# Patient Record
Sex: MALE | Race: BLACK OR AFRICAN AMERICAN | NOT HISPANIC OR LATINO | URBAN - METROPOLITAN AREA
[De-identification: names, ages, dates, MRNs, and addresses within clinical notes are randomized per-mention and may not be internally consistent; named-entity substitution may affect disease eponyms.]

---

## 2017-01-07 ENCOUNTER — EMERGENCY (EMERGENCY)
Facility: HOSPITAL | Age: 57
LOS: 1 days | Discharge: HOME | End: 2017-01-07
Attending: EMERGENCY MEDICINE | Admitting: EMERGENCY MEDICINE
Payer: COMMERCIAL

## 2017-01-07 VITALS
DIASTOLIC BLOOD PRESSURE: 81 MMHG | RESPIRATION RATE: 19 BRPM | HEART RATE: 76 BPM | TEMPERATURE: 99 F | SYSTOLIC BLOOD PRESSURE: 126 MMHG | OXYGEN SATURATION: 100 %

## 2017-01-07 VITALS
OXYGEN SATURATION: 98 % | TEMPERATURE: 98 F | SYSTOLIC BLOOD PRESSURE: 133 MMHG | RESPIRATION RATE: 20 BRPM | DIASTOLIC BLOOD PRESSURE: 86 MMHG | HEART RATE: 78 BPM

## 2017-01-07 DIAGNOSIS — Z98.89 OTHER SPECIFIED POSTPROCEDURAL STATES: Chronic | ICD-10-CM

## 2017-01-07 LAB
ALBUMIN SERPL ELPH-MCNC: 4.4 G/DL — SIGNIFICANT CHANGE UP (ref 3.3–5)
ALP SERPL-CCNC: 55 U/L — SIGNIFICANT CHANGE UP (ref 40–120)
ALT FLD-CCNC: 23 U/L — SIGNIFICANT CHANGE UP (ref 4–41)
APTT BLD: 35.6 SEC — SIGNIFICANT CHANGE UP (ref 27.5–37.4)
AST SERPL-CCNC: 30 U/L — SIGNIFICANT CHANGE UP (ref 4–40)
BASE EXCESS BLDV CALC-SCNC: 4.1 MMOL/L — SIGNIFICANT CHANGE UP
BASOPHILS # BLD AUTO: 0.03 K/UL — SIGNIFICANT CHANGE UP (ref 0–0.2)
BASOPHILS NFR BLD AUTO: 0.7 % — SIGNIFICANT CHANGE UP (ref 0–2)
BILIRUB SERPL-MCNC: 0.5 MG/DL — SIGNIFICANT CHANGE UP (ref 0.2–1.2)
BLOOD GAS VENOUS - CREATININE: 1.43 MG/DL — HIGH (ref 0.5–1.3)
BUN SERPL-MCNC: 21 MG/DL — SIGNIFICANT CHANGE UP (ref 7–23)
CALCIUM SERPL-MCNC: 9.5 MG/DL — SIGNIFICANT CHANGE UP (ref 8.4–10.5)
CHLORIDE BLDV-SCNC: 107 MMOL/L — SIGNIFICANT CHANGE UP (ref 96–108)
CHLORIDE SERPL-SCNC: 105 MMOL/L — SIGNIFICANT CHANGE UP (ref 98–107)
CK MB BLD-MCNC: 0.7 — SIGNIFICANT CHANGE UP (ref 0–2.5)
CK MB BLD-MCNC: 3.12 NG/ML — SIGNIFICANT CHANGE UP (ref 1–6.6)
CK SERPL-CCNC: 437 U/L — HIGH (ref 30–200)
CO2 SERPL-SCNC: 28 MMOL/L — SIGNIFICANT CHANGE UP (ref 22–31)
CREAT SERPL-MCNC: 1.39 MG/DL — HIGH (ref 0.5–1.3)
EOSINOPHIL # BLD AUTO: 0.06 K/UL — SIGNIFICANT CHANGE UP (ref 0–0.5)
EOSINOPHIL NFR BLD AUTO: 1.4 % — SIGNIFICANT CHANGE UP (ref 0–6)
GAS PNL BLDV: 140 MMOL/L — SIGNIFICANT CHANGE UP (ref 136–146)
GLUCOSE BLDV-MCNC: 86 — SIGNIFICANT CHANGE UP (ref 70–99)
GLUCOSE SERPL-MCNC: 92 MG/DL — SIGNIFICANT CHANGE UP (ref 70–99)
HCO3 BLDV-SCNC: 27 MMOL/L — SIGNIFICANT CHANGE UP (ref 20–27)
HCT VFR BLD CALC: 38.7 % — LOW (ref 39–50)
HCT VFR BLDV CALC: 41.5 % — SIGNIFICANT CHANGE UP (ref 39–51)
HGB BLD-MCNC: 13.1 G/DL — SIGNIFICANT CHANGE UP (ref 13–17)
HGB BLDV-MCNC: 13.5 G/DL — SIGNIFICANT CHANGE UP (ref 13–17)
IMM GRANULOCYTES NFR BLD AUTO: 0.2 % — SIGNIFICANT CHANGE UP (ref 0–1.5)
INR BLD: 0.96 — SIGNIFICANT CHANGE UP (ref 0.87–1.18)
LACTATE BLDV-MCNC: 0.7 MMOL/L — SIGNIFICANT CHANGE UP (ref 0.5–2)
LYMPHOCYTES # BLD AUTO: 1.12 K/UL — SIGNIFICANT CHANGE UP (ref 1–3.3)
LYMPHOCYTES # BLD AUTO: 26.9 % — SIGNIFICANT CHANGE UP (ref 13–44)
MCHC RBC-ENTMCNC: 32.8 PG — SIGNIFICANT CHANGE UP (ref 27–34)
MCHC RBC-ENTMCNC: 33.9 % — SIGNIFICANT CHANGE UP (ref 32–36)
MCV RBC AUTO: 96.8 FL — SIGNIFICANT CHANGE UP (ref 80–100)
MONOCYTES # BLD AUTO: 0.37 K/UL — SIGNIFICANT CHANGE UP (ref 0–0.9)
MONOCYTES NFR BLD AUTO: 8.9 % — SIGNIFICANT CHANGE UP (ref 2–14)
NEUTROPHILS # BLD AUTO: 2.57 K/UL — SIGNIFICANT CHANGE UP (ref 1.8–7.4)
NEUTROPHILS NFR BLD AUTO: 61.9 % — SIGNIFICANT CHANGE UP (ref 43–77)
PCO2 BLDV: 47 MMHG — SIGNIFICANT CHANGE UP (ref 41–51)
PH BLDV: 7.4 PH — SIGNIFICANT CHANGE UP (ref 7.32–7.43)
PLATELET # BLD AUTO: 217 K/UL — SIGNIFICANT CHANGE UP (ref 150–400)
PMV BLD: 10.4 FL — SIGNIFICANT CHANGE UP (ref 7–13)
PO2 BLDV: 42 MMHG — HIGH (ref 35–40)
POTASSIUM BLDV-SCNC: 3.8 MMOL/L — SIGNIFICANT CHANGE UP (ref 3.4–4.5)
POTASSIUM SERPL-MCNC: 4 MMOL/L — SIGNIFICANT CHANGE UP (ref 3.5–5.3)
POTASSIUM SERPL-SCNC: 4 MMOL/L — SIGNIFICANT CHANGE UP (ref 3.5–5.3)
PROT SERPL-MCNC: 7.2 G/DL — SIGNIFICANT CHANGE UP (ref 6–8.3)
PROTHROM AB SERPL-ACNC: 10.9 SEC — SIGNIFICANT CHANGE UP (ref 10–13.1)
RBC # BLD: 4 M/UL — LOW (ref 4.2–5.8)
RBC # FLD: 14.1 % — SIGNIFICANT CHANGE UP (ref 10.3–14.5)
SAO2 % BLDV: 76.2 % — SIGNIFICANT CHANGE UP (ref 60–85)
SODIUM SERPL-SCNC: 144 MMOL/L — SIGNIFICANT CHANGE UP (ref 135–145)
TROPONIN T SERPL-MCNC: < 0.06 NG/ML — SIGNIFICANT CHANGE UP (ref 0–0.06)
WBC # BLD: 4.16 K/UL — SIGNIFICANT CHANGE UP (ref 3.8–10.5)
WBC # FLD AUTO: 4.16 K/UL — SIGNIFICANT CHANGE UP (ref 3.8–10.5)

## 2017-01-07 PROCEDURE — 71020: CPT | Mod: 26

## 2017-01-07 PROCEDURE — 99284 EMERGENCY DEPT VISIT MOD MDM: CPT | Mod: 25

## 2017-01-07 PROCEDURE — 93010 ELECTROCARDIOGRAM REPORT: CPT

## 2017-01-07 RX ORDER — SODIUM CHLORIDE 9 MG/ML
1000 INJECTION INTRAMUSCULAR; INTRAVENOUS; SUBCUTANEOUS ONCE
Qty: 0 | Refills: 0 | Status: COMPLETED | OUTPATIENT
Start: 2017-01-07 | End: 2017-01-07

## 2017-01-07 RX ORDER — FAMOTIDINE 10 MG/ML
20 INJECTION INTRAVENOUS ONCE
Qty: 0 | Refills: 0 | Status: COMPLETED | OUTPATIENT
Start: 2017-01-07 | End: 2017-01-07

## 2017-01-07 RX ORDER — LIDOCAINE 4 G/100G
10 CREAM TOPICAL ONCE
Qty: 0 | Refills: 0 | Status: COMPLETED | OUTPATIENT
Start: 2017-01-07 | End: 2017-01-07

## 2017-01-07 RX ADMIN — SODIUM CHLORIDE 2000 MILLILITER(S): 9 INJECTION INTRAMUSCULAR; INTRAVENOUS; SUBCUTANEOUS at 19:09

## 2017-01-07 RX ADMIN — FAMOTIDINE 20 MILLIGRAM(S): 10 INJECTION INTRAVENOUS at 17:17

## 2017-01-07 RX ADMIN — LIDOCAINE 10 MILLILITER(S): 4 CREAM TOPICAL at 17:17

## 2017-01-07 RX ADMIN — Medication 30 MILLILITER(S): at 17:17

## 2017-01-07 NOTE — ED PROVIDER NOTE - FAMILY HISTORY
Mother  Still living? Unknown  Family history of diabetes mellitus, Age at diagnosis: Age Unknown     Aunt  Still living? Unknown  Family history of diabetes mellitus, Age at diagnosis: Age Unknown

## 2017-01-07 NOTE — ED PROVIDER NOTE - OBJECTIVE STATEMENT
Keshia att: 57M hx esoph ca c/o 3-4d midsternal ache. 2004 Patient diagnosed with esophageal cancer, remote radiation chemo, now gets routine endoscopy q2-3years. December 23 2016 patient underwent routine endoscopy, no complication. Past few days patient notes chest discomfort, points midsternal, ache, constant, nonradiating, nonexertional, neg sob pleurisy or cough associated. Worse with lateral movements (demonstrates with lateral twisting), improved yesterday with pepcid AC. Denies palpitation, pleurisy, swollen or tender calves. Neg hx dvt pe anticoag. Occasional smoker, personal hx of htn hld, last stress years ago, no fam hx of cad. Keshia att: 57M hx esoph ca c/o 3-4d midsternal ache. 2004 Patient diagnosed with esophageal cancer, remote radiation chemo, now gets routine endoscopy q2-3years. December 23 2016 patient underwent routine endoscopy, no complication, biospy neg active disease. Past few days patient notes chest discomfort, points midsternal, ache, constant, nonradiating, nonexertional, neg sob pleurisy or cough associated. Worse with lateral movements (demonstrates with lateral twisting), improved yesterday with pepcid AC. Denies palpitation, pleurisy, swollen or tender calves. Neg hx dvt pe anticoag. Occasional smoker, personal hx of htn hld, last stress years ago, no fam hx of cad.

## 2017-01-07 NOTE — ED PROVIDER NOTE - ATTENDING CONTRIBUTION TO CARE
Dr. Schmitt: I have personally seen and examined this patient at the bedside. I have fully participated in the care of this patient. I have reviewed all pertinent clinical information, including history, physical exam, plan and the Resident's note and agree except as noted. HPI above as by me. PE above as by me. MDM 57M hx remote esophageal ca, remote rad chemo, c/o constant midsternal cp x 3-4 days. Heart score 2 w nonisch ekg, low suspicion acs, cxr cbc cmp ce x 1. Neg pleurisy, tachypnea, leg swelling, abnl vitals, symptoms reproducible with lateral movement, less likely PE. Five to ten minute discussion regarding hpi and signs of cardiac disease and PE with patient. Discussed with patient will do prelim labs and xr, if normal dc and follow-up with cardiology, return for worsening cp, pleurisy, sob.

## 2017-01-07 NOTE — ED PROVIDER NOTE - PSH
S/P Achilles tendon repair  right  S/P hernia repair    S/P knee surgery  right  S/P wrist surgery  right

## 2017-01-07 NOTE — ED PROVIDER NOTE - GASTROINTESTINAL, MLM
Abdomen soft, non-tender and non-distended without organomegaly Normal bowel sounds. Ventral hernia reducible

## 2017-01-07 NOTE — ED ADULT TRIAGE NOTE - CHIEF COMPLAINT QUOTE
S/P Endoscopy few days ago coming with mid sternal cp x 2 days/dizziness/SOB today   Hx. Esophageal CA

## 2017-06-05 ENCOUNTER — EMERGENCY (EMERGENCY)
Facility: HOSPITAL | Age: 57
LOS: 1 days | Discharge: HOME | End: 2017-06-05
Attending: EMERGENCY MEDICINE | Admitting: EMERGENCY MEDICINE
Payer: COMMERCIAL

## 2017-06-05 VITALS
OXYGEN SATURATION: 98 % | DIASTOLIC BLOOD PRESSURE: 90 MMHG | SYSTOLIC BLOOD PRESSURE: 137 MMHG | RESPIRATION RATE: 18 BRPM | HEART RATE: 83 BPM | TEMPERATURE: 98 F

## 2017-06-05 VITALS
DIASTOLIC BLOOD PRESSURE: 77 MMHG | OXYGEN SATURATION: 97 % | TEMPERATURE: 99 F | HEART RATE: 73 BPM | RESPIRATION RATE: 18 BRPM | SYSTOLIC BLOOD PRESSURE: 114 MMHG

## 2017-06-05 DIAGNOSIS — Z98.89 OTHER SPECIFIED POSTPROCEDURAL STATES: Chronic | ICD-10-CM

## 2017-06-05 LAB
ALBUMIN SERPL ELPH-MCNC: 4.8 G/DL — SIGNIFICANT CHANGE UP (ref 3.3–5)
ALP SERPL-CCNC: 73 U/L — SIGNIFICANT CHANGE UP (ref 40–120)
ALT FLD-CCNC: 25 U/L — SIGNIFICANT CHANGE UP (ref 4–41)
AST SERPL-CCNC: 31 U/L — SIGNIFICANT CHANGE UP (ref 4–40)
BASOPHILS # BLD AUTO: 0.04 K/UL — SIGNIFICANT CHANGE UP (ref 0–0.2)
BASOPHILS NFR BLD AUTO: 0.4 % — SIGNIFICANT CHANGE UP (ref 0–2)
BILIRUB SERPL-MCNC: 0.4 MG/DL — SIGNIFICANT CHANGE UP (ref 0.2–1.2)
BUN SERPL-MCNC: 21 MG/DL — SIGNIFICANT CHANGE UP (ref 7–23)
CALCIUM SERPL-MCNC: 10.3 MG/DL — SIGNIFICANT CHANGE UP (ref 8.4–10.5)
CHLORIDE SERPL-SCNC: 102 MMOL/L — SIGNIFICANT CHANGE UP (ref 98–107)
CK MB BLD-MCNC: 0.7 — SIGNIFICANT CHANGE UP (ref 0–2.5)
CK MB BLD-MCNC: 3.87 NG/ML — SIGNIFICANT CHANGE UP (ref 1–6.6)
CK SERPL-CCNC: 575 U/L — HIGH (ref 30–200)
CO2 SERPL-SCNC: 25 MMOL/L — SIGNIFICANT CHANGE UP (ref 22–31)
CREAT SERPL-MCNC: 1.35 MG/DL — HIGH (ref 0.5–1.3)
EOSINOPHIL # BLD AUTO: 0.03 K/UL — SIGNIFICANT CHANGE UP (ref 0–0.5)
EOSINOPHIL NFR BLD AUTO: 0.3 % — SIGNIFICANT CHANGE UP (ref 0–6)
GLUCOSE SERPL-MCNC: 122 MG/DL — HIGH (ref 70–99)
HCT VFR BLD CALC: 44.4 % — SIGNIFICANT CHANGE UP (ref 39–50)
HGB BLD-MCNC: 15.2 G/DL — SIGNIFICANT CHANGE UP (ref 13–17)
IMM GRANULOCYTES NFR BLD AUTO: 0.2 % — SIGNIFICANT CHANGE UP (ref 0–1.5)
LYMPHOCYTES # BLD AUTO: 1.54 K/UL — SIGNIFICANT CHANGE UP (ref 1–3.3)
LYMPHOCYTES # BLD AUTO: 15.7 % — SIGNIFICANT CHANGE UP (ref 13–44)
MCHC RBC-ENTMCNC: 33 PG — SIGNIFICANT CHANGE UP (ref 27–34)
MCHC RBC-ENTMCNC: 34.2 % — SIGNIFICANT CHANGE UP (ref 32–36)
MCV RBC AUTO: 96.3 FL — SIGNIFICANT CHANGE UP (ref 80–100)
MONOCYTES # BLD AUTO: 0.84 K/UL — SIGNIFICANT CHANGE UP (ref 0–0.9)
MONOCYTES NFR BLD AUTO: 8.6 % — SIGNIFICANT CHANGE UP (ref 2–14)
NEUTROPHILS # BLD AUTO: 7.33 K/UL — SIGNIFICANT CHANGE UP (ref 1.8–7.4)
NEUTROPHILS NFR BLD AUTO: 74.8 % — SIGNIFICANT CHANGE UP (ref 43–77)
PLATELET # BLD AUTO: 242 K/UL — SIGNIFICANT CHANGE UP (ref 150–400)
PMV BLD: 11 FL — SIGNIFICANT CHANGE UP (ref 7–13)
POTASSIUM SERPL-MCNC: 3.9 MMOL/L — SIGNIFICANT CHANGE UP (ref 3.5–5.3)
POTASSIUM SERPL-SCNC: 3.9 MMOL/L — SIGNIFICANT CHANGE UP (ref 3.5–5.3)
PROT SERPL-MCNC: 7.8 G/DL — SIGNIFICANT CHANGE UP (ref 6–8.3)
RBC # BLD: 4.61 M/UL — SIGNIFICANT CHANGE UP (ref 4.2–5.8)
RBC # FLD: 13.9 % — SIGNIFICANT CHANGE UP (ref 10.3–14.5)
SODIUM SERPL-SCNC: 144 MMOL/L — SIGNIFICANT CHANGE UP (ref 135–145)
TROPONIN T SERPL-MCNC: < 0.06 NG/ML — SIGNIFICANT CHANGE UP (ref 0–0.06)
WBC # BLD: 9.8 K/UL — SIGNIFICANT CHANGE UP (ref 3.8–10.5)
WBC # FLD AUTO: 9.8 K/UL — SIGNIFICANT CHANGE UP (ref 3.8–10.5)

## 2017-06-05 PROCEDURE — 99285 EMERGENCY DEPT VISIT HI MDM: CPT | Mod: 25

## 2017-06-05 PROCEDURE — 71020: CPT | Mod: 26

## 2017-06-05 RX ORDER — CYCLOBENZAPRINE HYDROCHLORIDE 10 MG/1
1 TABLET, FILM COATED ORAL
Qty: 15 | Refills: 0 | OUTPATIENT
Start: 2017-06-05 | End: 2017-06-10

## 2017-06-05 RX ORDER — KETOROLAC TROMETHAMINE 30 MG/ML
30 SYRINGE (ML) INJECTION ONCE
Qty: 0 | Refills: 0 | Status: DISCONTINUED | OUTPATIENT
Start: 2017-06-05 | End: 2017-06-05

## 2017-06-05 RX ADMIN — Medication 30 MILLIGRAM(S): at 03:57

## 2017-06-05 RX ADMIN — Medication 30 MILLIGRAM(S): at 04:24

## 2017-06-05 NOTE — ED PROVIDER NOTE - MEDICAL DECISION MAKING DETAILS
56 y/o M with shoulder pain r/o ACS vs muscle spasm as mostly is reproducible pain to palpation. Will do labs, CXR, pain meds and reassess

## 2017-06-05 NOTE — ED ADULT NURSE NOTE - CHPI ED SYMPTOMS NEG
no chills/no dizziness/no nausea/no fever/no vomiting/no tingling/no numbness/no weakness/no decreased eating/drinking

## 2017-06-05 NOTE — ED PROVIDER NOTE - OBJECTIVE STATEMENT
57ym hx htn, hld p/w left shoulder/upper back (trap) pain that began at 9pm tonight while pt was lying in bed. No preceding trauma, fall, or heavy use of left shoulder. Pain progressively worsening since onset. Pt took a percocet earlier in night without relief. Pain is reproducible with pressure on left upper trap muscle and with any shoulder mvmnt. Pt appears very uncomfortable. He has had multiple episodes of similar pain in this shoulder that resolve spontaneously but this episode is more painful. 57ym hx htn, hld p/w left shoulder/upper back (trap) pain that began at 9pm tonight while pt was lying in bed. No preceding trauma, fall, or heavy use of left shoulder. Pain progressively worsening since onset. Pt took a percocet earlier in night without relief. Pain is reproducible with pressure on left upper trap muscle and with any shoulder movement. Pt appears very uncomfortable. He has had multiple episodes of similar pain in this shoulder that resolve spontaneously but this episode is more painful.

## 2017-06-05 NOTE — ED ADULT NURSE NOTE - OBJECTIVE STATEMENT
Pt 57y male [id number] presents to ED c/o left shoulder pain radiating to neck and upper back, pain started around 9pm, pain described are aching/sharp and constant. Pain worsens with movement of shoulder and palpation, pt breathing rapid and appears uncomfortable and in pain. Pt denies any trauma to shoulder, no deformity or bruising noted. Pt unable to lay on stretcher due to pain, states that positioning worsens pain. Pt denies N/V/D. HA, fever, chills. IV placed in pts right AC 20G, labs drawn and sent, will continue to monitor.

## 2017-06-05 NOTE — ED PROVIDER NOTE - MUSCULOSKELETAL, MLM
Spine appears normal, range of motion is not limited, severe tenderness in left paracerviical region and along upper left trap muscle, pt unable to abduct left arm >90 degrees 2/2 pain.

## 2017-06-05 NOTE — ED ADULT TRIAGE NOTE - CHIEF COMPLAINT QUOTE
Pt [facility] from Home, C/o Left Shoulder pain radiates to Back and Neck With SOB, Dizziness Denies N V Headache MHX HTN Hypothyroid

## 2017-06-05 NOTE — ED PROVIDER NOTE - ATTENDING CONTRIBUTION TO CARE
Case of a 58 y/o male hx htn, hld and esophageal cancer presenting with left shoulder/upper back (trap) pain that began at 9pm yesterday. Patient denies any chest pain, nausea or dizziness. Physical exam with left sided muscle spasm. Patient with probable musculoskeletal pain, although due to sudden onset, will order cardiac workup, CXR and reasses after pain meds and muscle relaxant. Agree with resident's physical exam, assessment and documentation

## 2017-07-16 ENCOUNTER — EMERGENCY (EMERGENCY)
Facility: HOSPITAL | Age: 57
LOS: 1 days | Discharge: HOME | End: 2017-07-16
Attending: EMERGENCY MEDICINE | Admitting: EMERGENCY MEDICINE
Payer: COMMERCIAL

## 2017-07-16 VITALS
SYSTOLIC BLOOD PRESSURE: 135 MMHG | OXYGEN SATURATION: 100 % | TEMPERATURE: 98 F | DIASTOLIC BLOOD PRESSURE: 79 MMHG | HEART RATE: 79 BPM | RESPIRATION RATE: 16 BRPM

## 2017-07-16 DIAGNOSIS — Z98.89 OTHER SPECIFIED POSTPROCEDURAL STATES: Chronic | ICD-10-CM

## 2017-07-16 PROCEDURE — 99284 EMERGENCY DEPT VISIT MOD MDM: CPT

## 2017-07-16 PROCEDURE — 73110 X-RAY EXAM OF WRIST: CPT | Mod: 26,LT

## 2017-07-16 RX ORDER — IBUPROFEN 200 MG
600 TABLET ORAL ONCE
Qty: 0 | Refills: 0 | Status: COMPLETED | OUTPATIENT
Start: 2017-07-16 | End: 2017-07-16

## 2017-07-16 RX ADMIN — Medication 600 MILLIGRAM(S): at 13:02

## 2017-07-16 NOTE — ED PROVIDER NOTE - MEDICAL DECISION MAKING DETAILS
57y M with L wrist swelling and hx of use as a . Concerned for wrist strain. Plan: XR L wrist to r/o fx and give analgesia.

## 2017-07-16 NOTE — ED PROVIDER NOTE - PROGRESS NOTE DETAILS
YESSENIA Holcomb- Xray notable for possible age indeterminate fracture due to ossific fragment at distal ulna. Will place pt in volar splint and give ortho f/u. stable for dc.

## 2017-07-16 NOTE — ED PROVIDER NOTE - OBJECTIVE STATEMENT
57y M with hx of esophageal CA, high cholesterol , HTN, hypothyroid presents to ED with c/o pain and swelling to left wrist since yesterday. Denies fall or trauma. Works as a  and uses hands a lot. Had R hand surgery in the past. No shoulder pain. No fevers. Took 3 ibuprofen for pain today. Notes he was here few weeks ago for back pain and was told he had inflammation.

## 2018-06-19 ENCOUNTER — APPOINTMENT (OUTPATIENT)
Dept: SURGERY | Facility: CLINIC | Age: 58
End: 2018-06-19
Payer: COMMERCIAL

## 2018-06-19 VITALS
TEMPERATURE: 97.8 F | BODY MASS INDEX: 26.24 KG/M2 | HEART RATE: 70 BPM | SYSTOLIC BLOOD PRESSURE: 151 MMHG | HEIGHT: 75 IN | DIASTOLIC BLOOD PRESSURE: 97 MMHG | WEIGHT: 211 LBS

## 2018-06-19 PROCEDURE — 99243 OFF/OP CNSLTJ NEW/EST LOW 30: CPT

## 2018-07-06 ENCOUNTER — OUTPATIENT (OUTPATIENT)
Dept: OUTPATIENT SERVICES | Facility: HOSPITAL | Age: 58
LOS: 1 days | End: 2018-07-06
Payer: COMMERCIAL

## 2018-07-06 VITALS
HEART RATE: 64 BPM | HEIGHT: 75 IN | DIASTOLIC BLOOD PRESSURE: 92 MMHG | WEIGHT: 210.1 LBS | SYSTOLIC BLOOD PRESSURE: 140 MMHG | RESPIRATION RATE: 17 BRPM | OXYGEN SATURATION: 100 % | TEMPERATURE: 98 F

## 2018-07-06 DIAGNOSIS — Z98.89 OTHER SPECIFIED POSTPROCEDURAL STATES: Chronic | ICD-10-CM

## 2018-07-06 DIAGNOSIS — R22.0 LOCALIZED SWELLING, MASS AND LUMP, HEAD: ICD-10-CM

## 2018-07-06 DIAGNOSIS — R22.2 LOCALIZED SWELLING, MASS AND LUMP, TRUNK: Chronic | ICD-10-CM

## 2018-07-06 DIAGNOSIS — I10 ESSENTIAL (PRIMARY) HYPERTENSION: ICD-10-CM

## 2018-07-06 DIAGNOSIS — E03.9 HYPOTHYROIDISM, UNSPECIFIED: ICD-10-CM

## 2018-07-06 DIAGNOSIS — Z01.818 ENCOUNTER FOR OTHER PREPROCEDURAL EXAMINATION: ICD-10-CM

## 2018-07-06 PROCEDURE — G0463: CPT

## 2018-07-06 NOTE — H&P PST ADULT - HISTORY OF PRESENT ILLNESS
58 year old Black male in generally good health with history of Esophageal Cancer treated with chemo and radiation in 2004, Hypothyroidism, HLD presents with mass on his scalp. Mass has been present for awhile it is soft, mobile no pain or drainage noted. Pt sought surgical consult for its removal. Pt scheduled for excision of scalp mass.

## 2018-07-06 NOTE — H&P PST ADULT - PMH
Esophageal cancer  2004, chemo, radiation  High cholesterol    HTN (hypertension)    Hypothyroid    Mass of scalp

## 2018-07-06 NOTE — H&P PST ADULT - VISION (WITH CORRECTIVE LENSES IF THE PATIENT USUALLY WEARS THEM):
Partially impaired: cannot see medication labels or newsprint, but can see obstacles in path, and the surrounding layout; can count fingers at arm's length/glasses required

## 2018-07-06 NOTE — H&P PST ADULT - PSH
Chest mass  left in the 1980's  S/P Achilles tendon repair  right  S/P hernia repair    S/P knee surgery  right  S/P wrist surgery  right

## 2018-07-11 ENCOUNTER — TRANSCRIPTION ENCOUNTER (OUTPATIENT)
Age: 58
End: 2018-07-11

## 2018-07-12 ENCOUNTER — RESULT REVIEW (OUTPATIENT)
Age: 58
End: 2018-07-12

## 2018-07-12 ENCOUNTER — OUTPATIENT (OUTPATIENT)
Dept: OUTPATIENT SERVICES | Facility: HOSPITAL | Age: 58
LOS: 1 days | End: 2018-07-12
Payer: COMMERCIAL

## 2018-07-12 ENCOUNTER — APPOINTMENT (OUTPATIENT)
Dept: SURGERY | Facility: HOSPITAL | Age: 58
End: 2018-07-12

## 2018-07-12 VITALS
TEMPERATURE: 98 F | DIASTOLIC BLOOD PRESSURE: 79 MMHG | SYSTOLIC BLOOD PRESSURE: 111 MMHG | OXYGEN SATURATION: 98 % | HEART RATE: 76 BPM | RESPIRATION RATE: 14 BRPM

## 2018-07-12 VITALS
DIASTOLIC BLOOD PRESSURE: 75 MMHG | TEMPERATURE: 98 F | HEART RATE: 84 BPM | SYSTOLIC BLOOD PRESSURE: 116 MMHG | OXYGEN SATURATION: 98 % | RESPIRATION RATE: 16 BRPM

## 2018-07-12 DIAGNOSIS — Z98.89 OTHER SPECIFIED POSTPROCEDURAL STATES: Chronic | ICD-10-CM

## 2018-07-12 DIAGNOSIS — R22.0 LOCALIZED SWELLING, MASS AND LUMP, HEAD: ICD-10-CM

## 2018-07-12 DIAGNOSIS — R22.2 LOCALIZED SWELLING, MASS AND LUMP, TRUNK: Chronic | ICD-10-CM

## 2018-07-12 PROCEDURE — 88305 TISSUE EXAM BY PATHOLOGIST: CPT

## 2018-07-12 PROCEDURE — 21012 EXC FACE LES SBQ 2 CM/>: CPT | Mod: AS

## 2018-07-12 PROCEDURE — 21012 EXC FACE LES SBQ 2 CM/>: CPT | Mod: RT

## 2018-07-12 PROCEDURE — 88305 TISSUE EXAM BY PATHOLOGIST: CPT | Mod: 26

## 2018-07-12 PROCEDURE — 21012 EXC FACE LES SBQ 2 CM/>: CPT

## 2018-07-12 RX ORDER — SODIUM CHLORIDE 9 MG/ML
3 INJECTION INTRAMUSCULAR; INTRAVENOUS; SUBCUTANEOUS EVERY 8 HOURS
Qty: 0 | Refills: 0 | Status: DISCONTINUED | OUTPATIENT
Start: 2018-07-12 | End: 2018-07-12

## 2018-07-12 RX ORDER — LEVOTHYROXINE SODIUM 125 MCG
1 TABLET ORAL
Qty: 0 | Refills: 0 | COMMUNITY

## 2018-07-12 RX ORDER — ACETAMINOPHEN WITH CODEINE 300MG-30MG
1 TABLET ORAL
Qty: 12 | Refills: 0 | OUTPATIENT
Start: 2018-07-12

## 2018-07-12 RX ORDER — AMLODIPINE BESYLATE AND OLMESARTRAN MEDOXOMIL 10; 40 MG/1; MG/1
1 TABLET, FILM COATED ORAL
Qty: 0 | Refills: 0 | COMMUNITY

## 2018-07-12 RX ORDER — SIMVASTATIN 20 MG/1
1 TABLET, FILM COATED ORAL
Qty: 0 | Refills: 0 | COMMUNITY

## 2018-07-12 RX ORDER — HYDROMORPHONE HYDROCHLORIDE 2 MG/ML
0.5 INJECTION INTRAMUSCULAR; INTRAVENOUS; SUBCUTANEOUS
Qty: 0 | Refills: 0 | Status: DISCONTINUED | OUTPATIENT
Start: 2018-07-12 | End: 2018-07-12

## 2018-07-12 RX ORDER — ONDANSETRON 8 MG/1
4 TABLET, FILM COATED ORAL ONCE
Qty: 0 | Refills: 0 | Status: DISCONTINUED | OUTPATIENT
Start: 2018-07-12 | End: 2018-07-12

## 2018-07-12 RX ORDER — ACETAMINOPHEN WITH CODEINE 300MG-30MG
1 TABLET ORAL
Qty: 16 | Refills: 0 | OUTPATIENT
Start: 2018-07-12

## 2018-07-12 RX ORDER — ACETAMINOPHEN 500 MG
1000 TABLET ORAL ONCE
Qty: 0 | Refills: 0 | Status: DISCONTINUED | OUTPATIENT
Start: 2018-07-12 | End: 2018-07-12

## 2018-07-12 NOTE — ASU DISCHARGE PLAN (ADULT/PEDIATRIC). - SPECIAL INSTRUCTIONS
You may remove dressing after 48 hours. Leave white steri-strips in place and allow them to fall off on their own. Pat dry.

## 2018-07-12 NOTE — ASU DISCHARGE PLAN (ADULT/PEDIATRIC). - MEDICATION SUMMARY - MEDICATIONS TO TAKE
I will START or STAY ON the medications listed below when I get home from the hospital:    Tylenol with Codeine #3 oral tablet  -- 1-2  tab(s) by mouth every 4-6 hours, As Needed -for severe pain MDD:6  -- Caution federal law prohibits the transfer of this drug to any person other  than the person for whom it was prescribed.  May cause drowsiness.  Alcohol may intensify this effect.  Use care when operating dangerous machinery.  This product contains acetaminophen.  Do not use  with any other product containing acetaminophen to prevent possible liver damage.  Using more of this medication than prescribed may cause serious breathing problems.    -- Indication: For postoperative pain    simvastatin 20 mg oral tablet  -- 1 tab(s) by mouth once a day (at bedtime)  -- Indication: For hyperlipidemia    amlodipine-olmesartan 5 mg-20 mg oral tablet  -- 1 tab(s) by mouth once a day  -- Indication: For hypertension    levothyroxine 75 mcg (0.075 mg) oral tablet  -- 1 tab(s) by mouth once a day  -- Indication: For hypothyroid

## 2018-07-12 NOTE — BRIEF OPERATIVE NOTE - PROCEDURE
<<-----Click on this checkbox to enter Procedure Excision of mass of scalp  07/12/2018    Active  OLIVIA

## 2018-07-12 NOTE — ASU DISCHARGE PLAN (ADULT/PEDIATRIC). - NOTIFY
Persistent Nausea and Vomiting/Bleeding that does not stop/Numbness, color, or temperature change to extremity/Unable to Urinate/Excessive Diarrhea/Pain not relieved by Medications/Increased Irritability or Sluggishness/Swelling that continues/Numbness, tingling/Inability to Tolerate Liquids or Foods/Fever greater than 101

## 2018-07-17 LAB — SURGICAL PATHOLOGY FINAL REPORT - CH: SIGNIFICANT CHANGE UP

## 2018-12-17 PROBLEM — R22.0 LOCALIZED SWELLING, MASS AND LUMP, HEAD: Chronic | Status: ACTIVE | Noted: 2018-07-06

## 2018-12-17 PROBLEM — E03.9 HYPOTHYROIDISM, UNSPECIFIED: Chronic | Status: ACTIVE | Noted: 2017-07-16

## 2018-12-21 ENCOUNTER — APPOINTMENT (OUTPATIENT)
Dept: ORTHOPEDIC SURGERY | Facility: CLINIC | Age: 58
End: 2018-12-21
Payer: COMMERCIAL

## 2018-12-21 VITALS
WEIGHT: 203 LBS | DIASTOLIC BLOOD PRESSURE: 97 MMHG | BODY MASS INDEX: 25.24 KG/M2 | HEIGHT: 75 IN | SYSTOLIC BLOOD PRESSURE: 139 MMHG | HEART RATE: 89 BPM

## 2018-12-21 DIAGNOSIS — G56.02 CARPAL TUNNEL SYNDROME, LEFT UPPER LIMB: ICD-10-CM

## 2018-12-21 DIAGNOSIS — M19.031 PRIMARY OSTEOARTHRITIS, RIGHT WRIST: ICD-10-CM

## 2018-12-21 DIAGNOSIS — M19.032 PRIMARY OSTEOARTHRITIS, LEFT WRIST: ICD-10-CM

## 2018-12-21 DIAGNOSIS — Z85.9 PERSONAL HISTORY OF MALIGNANT NEOPLASM, UNSPECIFIED: ICD-10-CM

## 2018-12-21 PROCEDURE — 99203 OFFICE O/P NEW LOW 30 MIN: CPT

## 2018-12-21 PROCEDURE — 73110 X-RAY EXAM OF WRIST: CPT | Mod: LT

## 2018-12-21 RX ORDER — SIMVASTATIN 80 MG/1
TABLET, FILM COATED ORAL
Refills: 0 | Status: ACTIVE | COMMUNITY

## 2019-03-07 ENCOUNTER — APPOINTMENT (OUTPATIENT)
Dept: GASTROENTEROLOGY | Facility: CLINIC | Age: 59
End: 2019-03-07

## 2019-03-18 ENCOUNTER — APPOINTMENT (OUTPATIENT)
Dept: GASTROENTEROLOGY | Facility: CLINIC | Age: 59
End: 2019-03-18
Payer: COMMERCIAL

## 2019-03-18 VITALS
HEART RATE: 86 BPM | HEIGHT: 75 IN | BODY MASS INDEX: 25.99 KG/M2 | OXYGEN SATURATION: 99 % | DIASTOLIC BLOOD PRESSURE: 90 MMHG | TEMPERATURE: 98.4 F | SYSTOLIC BLOOD PRESSURE: 150 MMHG | WEIGHT: 209 LBS

## 2019-03-18 DIAGNOSIS — I10 ESSENTIAL (PRIMARY) HYPERTENSION: ICD-10-CM

## 2019-03-18 PROCEDURE — 99214 OFFICE O/P EST MOD 30 MIN: CPT

## 2019-03-18 RX ORDER — LEVOTHYROXINE SODIUM 0.03 MG/1
25 TABLET ORAL
Refills: 0 | Status: ACTIVE | COMMUNITY

## 2019-03-18 NOTE — PHYSICAL EXAM
[General Appearance - Alert] : alert [General Appearance - In No Acute Distress] : in no acute distress [Sclera] : the sclera and conjunctiva were normal [PERRL With Normal Accommodation] : pupils were equal in size, round, and reactive to light [Extraocular Movements] : extraocular movements were intact [Outer Ear] : the ears and nose were normal in appearance [Oropharynx] : the oropharynx was normal [Neck Appearance] : the appearance of the neck was normal [Neck Cervical Mass (___cm)] : no neck mass was observed [Jugular Venous Distention Increased] : there was no jugular-venous distention [Thyroid Diffuse Enlargement] : the thyroid was not enlarged [Thyroid Nodule] : there were no palpable thyroid nodules [Auscultation Breath Sounds / Voice Sounds] : lungs were clear to auscultation bilaterally [Heart Rate And Rhythm] : heart rate was normal and rhythm regular [Heart Sounds] : normal S1 and S2 [Heart Sounds Gallop] : no gallops [Murmurs] : no murmurs [Heart Sounds Pericardial Friction Rub] : no pericardial rub [Bowel Sounds] : normal bowel sounds [Abdomen Soft] : soft [Abdomen Tenderness] : non-tender [] : no hepato-splenomegaly [Abdomen Mass (___ Cm)] : no abdominal mass palpated [No CVA Tenderness] : no ~M costovertebral angle tenderness [No Spinal Tenderness] : no spinal tenderness [Abnormal Walk] : normal gait [Nail Clubbing] : no clubbing  or cyanosis of the fingernails [Musculoskeletal - Swelling] : no joint swelling seen [Motor Tone] : muscle strength and tone were normal

## 2019-03-18 NOTE — HISTORY OF PRESENT ILLNESS
[de-identified] : 59 year old man with history of treated esophageal Ca many years ago. He is doing well. He also had colon polyps removed in the past. He denies rectal bleeding, melena or hematemesis. He is being treated for HTN.

## 2019-06-19 ENCOUNTER — APPOINTMENT (OUTPATIENT)
Dept: GASTROENTEROLOGY | Facility: AMBULATORY MEDICAL SERVICES | Age: 59
End: 2019-06-19
Payer: COMMERCIAL

## 2019-06-19 PROCEDURE — 43239 EGD BIOPSY SINGLE/MULTIPLE: CPT | Mod: 59

## 2019-06-19 PROCEDURE — 45380 COLONOSCOPY AND BIOPSY: CPT | Mod: 33

## 2019-07-11 ENCOUNTER — APPOINTMENT (OUTPATIENT)
Dept: GASTROENTEROLOGY | Facility: CLINIC | Age: 59
End: 2019-07-11
Payer: COMMERCIAL

## 2019-07-11 VITALS
SYSTOLIC BLOOD PRESSURE: 122 MMHG | OXYGEN SATURATION: 98 % | HEART RATE: 76 BPM | TEMPERATURE: 98 F | HEIGHT: 75 IN | WEIGHT: 206 LBS | RESPIRATION RATE: 17 BRPM | BODY MASS INDEX: 25.61 KG/M2 | DIASTOLIC BLOOD PRESSURE: 80 MMHG

## 2019-07-11 PROCEDURE — 99213 OFFICE O/P EST LOW 20 MIN: CPT

## 2019-07-11 RX ORDER — SODIUM SULFATE, POTASSIUM SULFATE, MAGNESIUM SULFATE 17.5; 3.13; 1.6 G/ML; G/ML; G/ML
17.5-3.13-1.6 SOLUTION, CONCENTRATE ORAL
Qty: 1 | Refills: 0 | Status: COMPLETED | COMMUNITY
Start: 2019-03-18 | End: 2019-07-11

## 2019-07-11 NOTE — HISTORY OF PRESENT ILLNESS
[de-identified] : 59 year old man with history of treated esophageal Cancer. He has been doing well. He underwent an EGD and biopsy on 6/19 that was normal . no evidence of recurrence. Screening colonoscopy showed  a small ascending  polyp that was removed. Pathology is tubular adenoma.

## 2021-02-16 NOTE — ED ADULT TRIAGE NOTE - MODE OF ARRIVAL
[facility]
[FreeTextEntry1] : COLONOSCOPY\par A/V SCREENING\par CHECK LAB WORK\par EKG: NSR AT 66 BPM, NO ACUTE ST-T WAVE CHANGES\par CARDIOLOGY FOR SCREENING\par ROUTINE GYN EXAMS\par HEALTHY DIET AND LIFESTYLE MODIFICATIONS\par HEALTHY WEIGHT LOSS \par FOLLOW-UP ALL SPECIALISTS AS DIRECTED \par CALL WITH ANY QUESTIONS, CONCERNS OR CHANGES

## 2021-07-12 ENCOUNTER — APPOINTMENT (OUTPATIENT)
Dept: GASTROENTEROLOGY | Facility: CLINIC | Age: 61
End: 2021-07-12
Payer: COMMERCIAL

## 2021-07-12 VITALS
SYSTOLIC BLOOD PRESSURE: 141 MMHG | BODY MASS INDEX: 26.36 KG/M2 | WEIGHT: 212 LBS | HEART RATE: 77 BPM | DIASTOLIC BLOOD PRESSURE: 96 MMHG | HEIGHT: 75 IN | TEMPERATURE: 97.4 F | OXYGEN SATURATION: 98 %

## 2021-07-12 DIAGNOSIS — Z20.822 CONTACT WITH AND (SUSPECTED) EXPOSURE TO COVID-19: ICD-10-CM

## 2021-07-12 PROCEDURE — 99213 OFFICE O/P EST LOW 20 MIN: CPT

## 2021-07-12 NOTE — ASSESSMENT
[FreeTextEntry1] : Attending Attestation \par \par Esophageal Reflux/ Esophageal Cancer \par \par A low acid / reflux diet was discussed in great detail including not smoking, not drinking alcohol, and not\par consuming foods that irritate the esophagus. It is helpful to eat small meals throughout the day instead\par of large meals. You should avoid eating before bedtime or lying down after you eat. It can be helpful to\par raise the head of your bed six inches. Additionally, you should maintain a healthy weight and good\par posture.. The patient was given written material to take home and review. \par \par Patient will continue to take Pepcid 20 mg PO 2 times per day. Medication reviewed side effects and adverse reactions. \par \par Patient will avoid triggers that worsen or exacerbate symptoms. \par \par EGD procedure ordered The risks benefits alternatives and complications of the procedure/s were explained to the patient at length. The patient was agreeable and we will proceed. \par \par Patient verbalized understanding of all information provided. All questions answered and reviewed. \par \par

## 2021-07-12 NOTE — PHYSICAL EXAM
[General Appearance - Alert] : alert [General Appearance - In No Acute Distress] : in no acute distress [Sclera] : the sclera and conjunctiva were normal [PERRL With Normal Accommodation] : pupils were equal in size, round, and reactive to light [Extraocular Movements] : extraocular movements were intact [Outer Ear] : the ears and nose were normal in appearance [Oropharynx] : the oropharynx was normal [Neck Appearance] : the appearance of the neck was normal [Neck Cervical Mass (___cm)] : no neck mass was observed [Jugular Venous Distention Increased] : there was no jugular-venous distention [Thyroid Diffuse Enlargement] : the thyroid was not enlarged [Thyroid Nodule] : there were no palpable thyroid nodules [] : no respiratory distress [Auscultation Breath Sounds / Voice Sounds] : lungs were clear to auscultation bilaterally [Heart Rate And Rhythm] : heart rate was normal and rhythm regular [Heart Sounds] : normal S1 and S2 [Heart Sounds Gallop] : no gallops [Murmurs] : no murmurs [Heart Sounds Pericardial Friction Rub] : no pericardial rub [Flat] : flat [Soft, Nontender] : the abdomen was soft and nontender [Normal] : normal to percussion [Cervical Lymph Nodes Enlarged Posterior Bilaterally] : posterior cervical [Cervical Lymph Nodes Enlarged Anterior Bilaterally] : anterior cervical [Supraclavicular Lymph Nodes Enlarged Bilaterally] : supraclavicular [Axillary Lymph Nodes Enlarged Bilaterally] : axillary [Femoral Lymph Nodes Enlarged Bilaterally] : femoral [Inguinal Lymph Nodes Enlarged Bilaterally] : inguinal [No CVA Tenderness] : no ~M costovertebral angle tenderness [No Spinal Tenderness] : no spinal tenderness [Abnormal Walk] : normal gait [Nail Clubbing] : no clubbing  or cyanosis of the fingernails [Musculoskeletal - Swelling] : no joint swelling seen [Motor Tone] : muscle strength and tone were normal [Deep Tendon Reflexes (DTR)] : deep tendon reflexes were 2+ and symmetric [Sensation] : the sensory exam was normal to light touch and pinprick [No Focal Deficits] : no focal deficits [Oriented To Time, Place, And Person] : oriented to person, place, and time [Impaired Insight] : insight and judgment were intact [Affect] : the affect was normal [Firm] : not firm [Rigid] : not rigid [Rebound] : no rebound [Guarding] : no guarding [Tompkins's] : a negative Tompkins's sign

## 2021-07-12 NOTE — HISTORY OF PRESENT ILLNESS
[de-identified] : 59 year old man with history of treated esophageal Cancer. He has been doing well. He underwent an EGD and biopsy on 6/2019 revealed small hiatal hernia and gastric antral mucosa with minimal inflammation. No evidence of recurrence. Patient still complains of intermittent heartburn and acid reflux. He takes Pepcid 20 mg max strength OTC 2 times per day for relief. Patient states symptoms are well controlled with medication if he avoids trigger foods. Denies nausea, vomiting, abdominal pain, or dysphagia. Denies rectal bleeding, melena, or hematemesis.

## 2021-07-12 NOTE — REVIEW OF SYSTEMS
[As Noted in HPI] : as noted in HPI [Heartburn] : heartburn [Negative] : Heme/Lymph [FreeTextEntry7] : Acid Reflux

## 2021-09-01 ENCOUNTER — APPOINTMENT (OUTPATIENT)
Dept: GASTROENTEROLOGY | Facility: AMBULATORY MEDICAL SERVICES | Age: 61
End: 2021-09-01
Payer: COMMERCIAL

## 2021-09-01 PROCEDURE — 43239 EGD BIOPSY SINGLE/MULTIPLE: CPT

## 2022-08-29 ENCOUNTER — APPOINTMENT (OUTPATIENT)
Dept: GASTROENTEROLOGY | Facility: CLINIC | Age: 62
End: 2022-08-29

## 2022-08-29 VITALS
WEIGHT: 207 LBS | SYSTOLIC BLOOD PRESSURE: 128 MMHG | HEART RATE: 89 BPM | OXYGEN SATURATION: 98 % | TEMPERATURE: 95.3 F | DIASTOLIC BLOOD PRESSURE: 91 MMHG | BODY MASS INDEX: 25.87 KG/M2

## 2022-08-29 DIAGNOSIS — K21.9 GASTRO-ESOPHAGEAL REFLUX DISEASE W/OUT ESOPHAGITIS: ICD-10-CM

## 2022-08-29 DIAGNOSIS — Z12.11 ENCOUNTER FOR SCREENING FOR MALIGNANT NEOPLASM OF COLON: ICD-10-CM

## 2022-08-29 PROCEDURE — 99214 OFFICE O/P EST MOD 30 MIN: CPT

## 2022-08-29 RX ORDER — OMEPRAZOLE 20 MG/1
20 CAPSULE, DELAYED RELEASE ORAL DAILY
Qty: 1 | Refills: 3 | Status: ACTIVE | COMMUNITY
Start: 2022-08-29 | End: 1900-01-01

## 2022-08-29 NOTE — HISTORY OF PRESENT ILLNESS
[de-identified] : 62-year-old man presents for a screening colonoscopy.  Last colonoscopy was in 2019.  He has a history of colon polyps removed.  He denies rectal bleeding, melena or hematemesis.  He also has a history of acid reflux and esophageal cancer in the past.  He remains in remission.  Planes that his acid reflux has worsened and has not been controlled with over-the-counter Pepcid.  He has daily reflux and regurgitation.

## 2022-08-29 NOTE — ASSESSMENT
[FreeTextEntry1] : Screening colonoscopy in this high-risk patient with a past history of colon polyps.  The importance of colon cancer screening and the colonoscopy prep was discussed with the patient.  He understands all questions were answered.\par GERD.  Patient had been stable however he has a recurrence of his reflux symptoms. Dietary and lifestyle modification discussed with the patient.  Omeprazole 20 mg in the a.m. started along with famotidine 20 mg at bedtime recommended.  Follow-up appointment in 1 month.\par \par

## 2022-11-16 ENCOUNTER — APPOINTMENT (OUTPATIENT)
Dept: GASTROENTEROLOGY | Facility: AMBULATORY MEDICAL SERVICES | Age: 62
End: 2022-11-16

## 2022-11-16 PROCEDURE — 45380 COLONOSCOPY AND BIOPSY: CPT | Mod: 33

## 2022-12-12 ENCOUNTER — APPOINTMENT (OUTPATIENT)
Dept: GASTROENTEROLOGY | Facility: CLINIC | Age: 62
End: 2022-12-12

## 2022-12-12 VITALS
WEIGHT: 212 LBS | TEMPERATURE: 97.7 F | HEIGHT: 75 IN | OXYGEN SATURATION: 99 % | SYSTOLIC BLOOD PRESSURE: 156 MMHG | HEART RATE: 72 BPM | DIASTOLIC BLOOD PRESSURE: 92 MMHG | BODY MASS INDEX: 26.36 KG/M2

## 2022-12-12 DIAGNOSIS — K63.5 POLYP OF COLON: ICD-10-CM

## 2022-12-12 DIAGNOSIS — C15.9 MALIGNANT NEOPLASM OF ESOPHAGUS, UNSPECIFIED: ICD-10-CM

## 2022-12-12 DIAGNOSIS — D12.2 BENIGN NEOPLASM OF ASCENDING COLON: ICD-10-CM

## 2022-12-12 DIAGNOSIS — Z09 ENCOUNTER FOR FOLLOW-UP EXAMINATION AFTER COMPLETED TREATMENT FOR CONDITIONS OTHER THAN MALIGNANT NEOPLASM: ICD-10-CM

## 2022-12-12 PROCEDURE — 99213 OFFICE O/P EST LOW 20 MIN: CPT

## 2022-12-12 NOTE — HISTORY OF PRESENT ILLNESS
[FreeTextEntry1] : 62-year-old man with history of colon polyps in the past.  He underwent a screening colonoscopy on November 16 that showed a small polyp in the ascending colon that was removed.  Pathology was tubular adenoma.  Several diverticula were also seen.  His last EGD was in 2021 for history of esophageal cancer and it was unremarkable.  He denies rectal bleeding, melena or hematemesis.  He has no dysphagia and is feeling well

## 2022-12-12 NOTE — PHYSICAL EXAM
[None] : no edema [Normal] : normal bowel sounds, non-tender, no masses, soft, no no hepato-splenomegaly [No CVA Tenderness] : no CVA  tenderness [Abnormal Walk] : normal gait

## 2022-12-12 NOTE — ASSESSMENT
[FreeTextEntry1] : Screening colonoscopy in this high-risk patient with a history of colon polyps.  The importance of colon cancer screening was discussed with the patient.  He will return in 3 years for a surveillance colonoscopy.\par History of esophageal cancer.  Patient will return in 2 years for a follow-up EGD.

## 2023-01-04 NOTE — H&P PST ADULT - SYMPTOMS
none Alert-The patient is alert, awake and responds to voice. The patient is oriented to time, place, and person. The triage nurse is able to obtain subjective information.

## 2024-07-22 ENCOUNTER — APPOINTMENT (OUTPATIENT)
Dept: GASTROENTEROLOGY | Facility: CLINIC | Age: 64
End: 2024-07-22
Payer: MEDICARE

## 2024-07-22 VITALS
BODY MASS INDEX: 26.73 KG/M2 | HEART RATE: 77 BPM | DIASTOLIC BLOOD PRESSURE: 90 MMHG | WEIGHT: 215 LBS | TEMPERATURE: 97.3 F | RESPIRATION RATE: 17 BRPM | OXYGEN SATURATION: 99 % | HEIGHT: 75 IN | SYSTOLIC BLOOD PRESSURE: 145 MMHG

## 2024-07-22 DIAGNOSIS — K21.9 GASTRO-ESOPHAGEAL REFLUX DISEASE W/OUT ESOPHAGITIS: ICD-10-CM

## 2024-07-22 DIAGNOSIS — C15.9 MALIGNANT NEOPLASM OF ESOPHAGUS, UNSPECIFIED: ICD-10-CM

## 2024-07-22 DIAGNOSIS — D12.2 BENIGN NEOPLASM OF ASCENDING COLON: ICD-10-CM

## 2024-07-22 PROCEDURE — 99204 OFFICE O/P NEW MOD 45 MIN: CPT

## 2024-07-22 NOTE — HISTORY OF PRESENT ILLNESS
[FreeTextEntry1] : 64-year-old man with history of esophageal cancer.  He is scheduled for a surveillance EGD.  He is feeling well with no complaints of dysphagia.  He also has a history of colon polyps is due for a surveillance colonoscopy November 2025.  He denies rectal bleeding, melena or hematemesis.

## 2024-07-22 NOTE — ASSESSMENT
[FreeTextEntry1] : GERD with a history of esophageal cancer. Dietary and lifestyle modification discussed with the patient.  Will schedule EGD and biopsy.  This was discussed with the patient.  He understands and all questions were answered. Colon cancer screening.  Importance of screening colonoscopy was discussed with the patient he will return in November 2025 for his

## 2024-08-29 ENCOUNTER — APPOINTMENT (OUTPATIENT)
Dept: GASTROENTEROLOGY | Facility: AMBULATORY MEDICAL SERVICES | Age: 64
End: 2024-08-29
Payer: MEDICARE

## 2024-08-29 PROCEDURE — 43239 EGD BIOPSY SINGLE/MULTIPLE: CPT

## 2024-09-15 ENCOUNTER — NON-APPOINTMENT (OUTPATIENT)
Age: 64
End: 2024-09-15

## 2024-09-16 ENCOUNTER — APPOINTMENT (OUTPATIENT)
Dept: GASTROENTEROLOGY | Facility: CLINIC | Age: 64
End: 2024-09-16
Payer: MEDICARE

## 2024-09-16 VITALS
BODY MASS INDEX: 26.11 KG/M2 | HEIGHT: 75 IN | SYSTOLIC BLOOD PRESSURE: 139 MMHG | OXYGEN SATURATION: 97 % | RESPIRATION RATE: 17 BRPM | DIASTOLIC BLOOD PRESSURE: 91 MMHG | HEART RATE: 81 BPM | WEIGHT: 210 LBS | TEMPERATURE: 97.9 F

## 2024-09-16 DIAGNOSIS — C15.9 MALIGNANT NEOPLASM OF ESOPHAGUS, UNSPECIFIED: ICD-10-CM

## 2024-09-16 DIAGNOSIS — K21.9 GASTRO-ESOPHAGEAL REFLUX DISEASE W/OUT ESOPHAGITIS: ICD-10-CM

## 2024-09-16 PROCEDURE — G2211 COMPLEX E/M VISIT ADD ON: CPT

## 2024-09-16 PROCEDURE — 99214 OFFICE O/P EST MOD 30 MIN: CPT

## 2024-09-18 NOTE — ASSESSMENT
[FreeTextEntry1] : GERD with history of esophageal CA. Dietary and lifestyle modification discussed with the patient.  Continue omeprazole 20 mg daily.  Follow-up EGD in 2 years.  This was discussed with the patient.  He understands and all questions were answered. Today's care provided as part of continued longitudinal relationship to serve patient's ongoing need for healthcare services.

## 2024-09-18 NOTE — HISTORY OF PRESENT ILLNESS
[FreeTextEntry1] : 64-year-old man with GERD and history of esophageal cancer.  Surveillance EGD for his esophageal cancer has been negative.  His acid reflux is well-controlled with diet and omeprazole 20 mg daily.